# Patient Record
(demographics unavailable — no encounter records)

---

## 2018-12-27 NOTE — RAD
PORTABLE CHEST 1 VIEW:

 

Date:  12/27/18 

Time:  1433 hours

 

HISTORY:  

Chest pain. 

 

FINDINGS:

The heart size is normal. The lungs are well expanded without focal areas of consolidation, pneumotho
races, or pleural effusions. 

 

IMPRESSION: 

No radiographic evidence of acute cardiopulmonary process. 

 

 

 

POS: OFF

## 2019-07-10 NOTE — RAD
LUMBAR SPINE:

7/10/19

 

Three views. 

 

HISTORY: 

Back pain from fall with injury.

 

Lumbar vertebrae maintain normal height and alignment. Disc spaces are preserved. No evidence of spon
dylolisthesis. No lytic or blastic process. 

 

IMPRESSION: 

Unremarkable lumbar spine. 

 

POS: [facility]

## 2019-07-10 NOTE — RAD
LEFT HIP:

7/10/19

 

Two views. 

 

INDICATIONS:

Hip pain.

 

No fracture. No significant degenerative change. Femoral head contour is normal. No osseous abnormali
ty seen.

 

IMPRESSION: 

No acute findings. 

 

POS: [facility]